# Patient Record
Sex: MALE | Race: WHITE | Employment: OTHER | URBAN - METROPOLITAN AREA
[De-identification: names, ages, dates, MRNs, and addresses within clinical notes are randomized per-mention and may not be internally consistent; named-entity substitution may affect disease eponyms.]

---

## 2017-11-14 ENCOUNTER — OFFICE VISIT (OUTPATIENT)
Dept: URGENT CARE | Facility: RETAIL CLINIC | Age: 52
End: 2017-11-14
Payer: COMMERCIAL

## 2017-11-14 VITALS
TEMPERATURE: 98.3 F | OXYGEN SATURATION: 97 % | DIASTOLIC BLOOD PRESSURE: 86 MMHG | SYSTOLIC BLOOD PRESSURE: 128 MMHG | HEART RATE: 78 BPM

## 2017-11-14 DIAGNOSIS — R43.9 SMELL OR TASTE PROBLEM: ICD-10-CM

## 2017-11-14 DIAGNOSIS — J01.90 ACUTE SINUSITIS WITH COEXISTING CONDITION REQUIRING PROPHYLACTIC TREATMENT: Primary | ICD-10-CM

## 2017-11-14 DIAGNOSIS — R09.81 NASAL SINUS CONGESTION: ICD-10-CM

## 2017-11-14 PROCEDURE — 99203 OFFICE O/P NEW LOW 30 MIN: CPT | Performed by: PHYSICIAN ASSISTANT

## 2017-11-14 NOTE — NURSING NOTE
"Chief Complaint   Patient presents with     Sinus Problem     sinus pressure and congestion x 2 days     Nasal Congestion     thick green nasal discharge x 2 days       Initial /86  Pulse 78  Temp 98.3  F (36.8  C) (Tympanic)  SpO2 97% Estimated body mass index is 25.99 kg/(m^2) as calculated from the following:    Height as of 11/16/12: 5' 6\" (1.676 m).    Weight as of 11/16/12: 161 lb (73 kg).  Medication Reconciliation: complete     Jessica Sundet      "

## 2017-11-14 NOTE — MR AVS SNAPSHOT
"              After Visit Summary   2017    Jacob Card    MRN: [id number]           Patient Information     Date Of Birth          1965        Visit Information        Provider Department      2017 1:40 PM Rosie Huff PA-C [facility]        Today's Diagnoses     Acute sinusitis with coexisting condition requiring prophylactic treatment    -  1    Nasal sinus congestion          Care Instructions      Please FOLLOW UP at primary care clinic if not improving, new symptoms, worse or this does not resolve.  CARE EVERYWHERE          Follow-ups after your visit        Who to contact     You can reach your care team any time of the day by calling [phone].  Notification of test results:  If you have an abnormal lab result, we will notify you by phone as soon as possible.         Additional Information About Your Visit        MyChart Information     [employer] lets you send messages to your doctor, view your test results, renew your prescriptions, schedule appointments and more. To sign up, go to [email or web address] . Click on \"Log in\" on the left side of the screen, which will take you to the Welcome page. Then click on \"Sign up Now\" on the right side of the page.     You will be asked to enter the access code listed below, as well as some personal information. Please follow the directions to create your username and password.     Your access code is: [email or web address]  Expires: 2018  1:56 PM     Your access code will  in 90 days. If you need help or a new code, please call your Center clinic or [phone].        Care EveryWhere ID     This is your Care EveryWhere ID. This could be used by other organizations to access your Center medical records  [id number]        Your Vitals Were     Pulse Temperature Pulse Oximetry             78 98.3  F (36.8  C) (Tympanic) 97%          Blood Pressure from Last 3 Encounters:   17 128/86   12 130/73 "   04/01/10 138/81    Weight from Last 3 Encounters:   11/16/12 161 lb (73 kg)   08/31/09 179 lb (81.2 kg)              Today, you had the following     No orders found for display         Today's Medication Changes          These changes are accurate as of: 11/14/17  1:56 PM.  If you have any questions, ask your nurse or doctor.               Start taking these medicines.        Dose/Directions    amoxicillin-clavulanate 875-125 MG per tablet   Commonly known as:  AUGMENTIN   Used for:  Acute sinusitis with coexisting condition requiring prophylactic treatment   Started by:  Rosie Huff PA-C        Dose:  1 tablet   Take 1 tablet by mouth 2 times daily   Quantity:  20 tablet   Refills:  0            Where to get your medicines      These medications were sent to [facility]  [address] [facility] [address]     Phone:  [phone]     amoxicillin-clavulanate 875-125 MG per tablet                Primary Care Provider Office Phone # Fax #    Bk SENIOR Avinash [phone]       [facility] [address] [facility] [address]        Equal Access to Services     SOPHIA JOHNSON AH: Hadii andrey mann hadasho Soomaali, waaxda luqadaha, qaybta kaalmada adebrennenyadavis, faith rose. So [facility] [phone].    ATENCIÓN: Si habla español, tiene a pat disposición servicios gratuitos de asistencia lingüística. [facility] [phone].    We comply with applicable federal civil rights laws and [place] laws. We do not discriminate on the basis of race, color, national origin, age, disability, sex, sexual orientation, or gender identity.            Thank you!     Thank you for choosing [facility]  for your care. Our goal is always to provide you with excellent care. Hearing back from our patients is one way we can continue to improve our services. Please take a few minutes to complete the written survey that you may receive in the  mail after your visit with us. Thank you!             Your Updated Medication List - Protect others around you: Learn how to safely use, store and throw away your medicines at www.disposemymeds.org.          This list is accurate as of: 11/14/17  1:56 PM.  Always use your most recent med list.                   Brand Name Dispense Instructions for use Diagnosis    amoxicillin-clavulanate 875-125 MG per tablet    AUGMENTIN    20 tablet    Take 1 tablet by mouth 2 times daily    Acute sinusitis with coexisting condition requiring prophylactic treatment       PRILOSEC PO      Take 10 mg by mouth.

## 2017-11-14 NOTE — PATIENT INSTRUCTIONS
Please FOLLOW UP at primary care clinic if not improving, new symptoms, worse or this does not resolve.  CARE EVERYWHERE

## 2017-11-14 NOTE — PROGRESS NOTES
"  Chief Complaint   Patient presents with     Sinus Problem     sinus pressure and congestion x 2 days     Nasal Congestion     thick green nasal discharge x 2 days         SUBJECTIVE:   Pt. presenting to [facility] -  with a chief complaint of purulent nasal drainage and change in taste and smell. Sinus HA and pressure. \"different than a head cold\"  No cough.   See CC.  Onset of symptoms sudden  Course of illness is worsening.    Severity moderate  Current and Associated symptoms: runny nose, stuffy nose, facial pain/pressure and headache  Treatment measures tried include None tried.  Predisposing factors include None.  Last antibiotic > year  Smoker no    ROS:  Afebrile   Energy level is a little <  ENT - denies ear pain, throat pain.  CP - no cough,SOB or chest pain   GI- - appetite fair. No nausea, vomiting or diarrhea.   No bowel or bladder changes   MSK - no joint pain or swelling   Skin: No rashes    No past medical history on file.  Past Surgical History:   Procedure Laterality Date     C APPENDECTOMY  1976     SPLENECTOMY  1976    kicked by a horse     Patient Active Problem List   Diagnosis     CARDIOVASCULAR SCREENING; LDL GOAL LESS THAN 130     Current Outpatient Prescriptions   Medication     Omeprazole (PRILOSEC PO)     No current facility-administered medications for this visit.          OBJECTIVE:  /86  Pulse 78  Temp 98.3  F (36.8  C) (Tympanic)  SpO2 97%    GENERAL APPEARANCE: cooperative, alert and no distress. Appears well hydrated.  EYES: conjunctiva clear  HENT: Rt ear canal  clear and TM normal   Lt ear canal clear and TM normal   Nose mod congestion. thick discharge  Mouth without ulcers or lesions. no erythema. no exudate. Some PND  NECK: supple, no palp ant nodes. No  posterior nodes.  RESP: lungs clear to auscultation - no rales, rhonchi or wheezes. Breathing easily.  CV: regular rates and rhythm  ABDOMEN:  soft, nontender, no HSM or masses and bowel sounds normal "   SKIN: no suspicious lesions or rashes  some tenderness to palpate over phillip max sinus areas.      ASSESSMENT:     Nasal sinus congestion  Acute sinusitis with coexisting condition requiring prophylactic treatment  Smell or taste problem      PLAN:  Symptomatic measures   Prescriptions as below. Discussed indications, dosing, side affects and adverse reactions of medications with  Patient -Augmentin  Will start Flonase (has this at home)  Eat yogurt daily or take a probiotic supplement when on antibiotics.  OTC cough suppressant/expectorant discussed  Salt water gargles - throat lozenges or honey/lemon tea if soothing   saline nasal spray for  nasal congestion   Cool mist vaporizer.   Stay in clean air environment.  > rest.  > fluids.  Contagiousness and hygiene discussed.  Fever and pain  control measures discuss  If unable to swallow or any breathing difficulty to go to ED AVS given and discussed:  Patient Instructions     Please FOLLOW UP at primary care clinic if not improving, new symptoms, worse or this does not resolve.  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  Patient is comfortable with this plan  Electronically signed,  SARAH Huff, PAC

## 2024-11-05 ENCOUNTER — LAB REQUISITION (OUTPATIENT)
Dept: LAB | Facility: CLINIC | Age: 59
End: 2024-11-05
Payer: COMMERCIAL

## 2024-11-05 DIAGNOSIS — R79.9 ABNORMAL FINDING OF BLOOD CHEMISTRY, UNSPECIFIED: ICD-10-CM

## 2024-11-06 ENCOUNTER — LAB REQUISITION (OUTPATIENT)
Dept: LAB | Facility: CLINIC | Age: 59
End: 2024-11-06

## 2024-11-06 DIAGNOSIS — I63.9 CEREBRAL INFARCTION, UNSPECIFIED (H): ICD-10-CM

## 2024-11-07 LAB
ANION GAP SERPL CALCULATED.3IONS-SCNC: 10 MMOL/L (ref 7–15)
BASOPHILS # BLD AUTO: 0.2 10E3/UL (ref 0–0.2)
BASOPHILS NFR BLD AUTO: 2 %
BUN SERPL-MCNC: 13.7 MG/DL (ref 8–23)
CALCIUM SERPL-MCNC: 8.6 MG/DL (ref 8.8–10.4)
CHLORIDE SERPL-SCNC: 106 MMOL/L (ref 98–107)
CREAT SERPL-MCNC: 0.96 MG/DL (ref 0.67–1.17)
EGFRCR SERPLBLD CKD-EPI 2021: 90 ML/MIN/1.73M2
EOSINOPHIL # BLD AUTO: 1.4 10E3/UL (ref 0–0.7)
EOSINOPHIL NFR BLD AUTO: 14 %
ERYTHROCYTE [DISTWIDTH] IN BLOOD BY AUTOMATED COUNT: 13 % (ref 10–15)
GLUCOSE SERPL-MCNC: 91 MG/DL (ref 70–99)
HCO3 SERPL-SCNC: 24 MMOL/L (ref 22–29)
HCT VFR BLD AUTO: 37 % (ref 40–53)
HGB BLD-MCNC: 12 G/DL (ref 13.3–17.7)
IMM GRANULOCYTES # BLD: 0 10E3/UL
IMM GRANULOCYTES NFR BLD: 0 %
LYMPHOCYTES # BLD AUTO: 4 10E3/UL (ref 0.8–5.3)
LYMPHOCYTES NFR BLD AUTO: 38 %
MCH RBC QN AUTO: 30.1 PG (ref 26.5–33)
MCHC RBC AUTO-ENTMCNC: 32.4 G/DL (ref 31.5–36.5)
MCV RBC AUTO: 93 FL (ref 78–100)
MONOCYTES # BLD AUTO: 0.9 10E3/UL (ref 0–1.3)
MONOCYTES NFR BLD AUTO: 9 %
NEUTROPHILS # BLD AUTO: 4 10E3/UL (ref 1.6–8.3)
NEUTROPHILS NFR BLD AUTO: 38 %
NRBC # BLD AUTO: 0 10E3/UL
NRBC BLD AUTO-RTO: 0 /100
PLATELET # BLD AUTO: 558 10E3/UL (ref 150–450)
POTASSIUM SERPL-SCNC: 4.3 MMOL/L (ref 3.4–5.3)
RBC # BLD AUTO: 3.99 10E6/UL (ref 4.4–5.9)
SODIUM SERPL-SCNC: 140 MMOL/L (ref 135–145)
WBC # BLD AUTO: 10.5 10E3/UL (ref 4–11)

## 2024-11-08 LAB
ANION GAP SERPL CALCULATED.3IONS-SCNC: 10 MMOL/L (ref 7–15)
BUN SERPL-MCNC: 10.9 MG/DL (ref 8–23)
CALCIUM SERPL-MCNC: 8.9 MG/DL (ref 8.8–10.4)
CHLORIDE SERPL-SCNC: 105 MMOL/L (ref 98–107)
CREAT SERPL-MCNC: 1.06 MG/DL (ref 0.67–1.17)
EGFRCR SERPLBLD CKD-EPI 2021: 80 ML/MIN/1.73M2
GLUCOSE SERPL-MCNC: 90 MG/DL (ref 70–99)
HCO3 SERPL-SCNC: 25 MMOL/L (ref 22–29)
POTASSIUM SERPL-SCNC: 4.3 MMOL/L (ref 3.4–5.3)
SODIUM SERPL-SCNC: 140 MMOL/L (ref 135–145)

## 2024-11-08 PROCEDURE — 80048 BASIC METABOLIC PNL TOTAL CA: CPT | Mod: ORL | Performed by: FAMILY MEDICINE

## 2024-11-08 PROCEDURE — P9604 ONE-WAY ALLOW PRORATED TRIP: HCPCS | Mod: ORL | Performed by: FAMILY MEDICINE

## 2024-11-08 PROCEDURE — 36415 COLL VENOUS BLD VENIPUNCTURE: CPT | Mod: ORL | Performed by: FAMILY MEDICINE
